# Patient Record
Sex: MALE | Race: WHITE | Employment: PART TIME | URBAN - METROPOLITAN AREA
[De-identification: names, ages, dates, MRNs, and addresses within clinical notes are randomized per-mention and may not be internally consistent; named-entity substitution may affect disease eponyms.]

---

## 2017-01-04 PROBLEM — I70.208 BRACHIAL ARTERY OCCLUSION, LEFT (HCC): Status: ACTIVE | Noted: 2017-01-04

## 2017-01-12 PROBLEM — Z94.0 HISTORY OF RENAL TRANSPLANT: Chronic | Status: ACTIVE | Noted: 2017-01-12

## 2017-01-12 PROBLEM — I10 ESSENTIAL HYPERTENSION: Chronic | Status: ACTIVE | Noted: 2017-01-12

## 2017-02-01 PROBLEM — I70.208 BRACHIAL ARTERY OCCLUSION, LEFT (HCC): Status: RESOLVED | Noted: 2017-01-04 | Resolved: 2017-02-01

## 2017-02-01 PROBLEM — Z98.890 POST-OPERATIVE STATE: Status: ACTIVE | Noted: 2017-02-01

## 2018-05-09 ENCOUNTER — OFFICE VISIT (OUTPATIENT)
Dept: VASCULAR SURGERY | Age: 48
End: 2018-05-09
Payer: COMMERCIAL

## 2018-05-09 DIAGNOSIS — R09.89 DECREASED RADIAL PULSE: Primary | ICD-10-CM

## 2018-05-09 DIAGNOSIS — F17.200 TOBACCO DEPENDENCE: ICD-10-CM

## 2018-05-09 PROBLEM — Z98.890 POST-OPERATIVE STATE: Status: RESOLVED | Noted: 2017-02-01 | Resolved: 2018-05-09

## 2018-05-09 PROCEDURE — 4004F PT TOBACCO SCREEN RCVD TLK: CPT | Performed by: SURGERY

## 2018-05-09 PROCEDURE — G8421 BMI NOT CALCULATED: HCPCS | Performed by: SURGERY

## 2018-05-09 PROCEDURE — 99213 OFFICE O/P EST LOW 20 MIN: CPT | Performed by: SURGERY

## 2018-05-09 PROCEDURE — G8427 DOCREV CUR MEDS BY ELIG CLIN: HCPCS | Performed by: SURGERY

## 2018-05-09 RX ORDER — POTASSIUM & SODIUM PHOSPHATES POWDER PACK 280-160-250 MG 280-160-250 MG
PACK ORAL
Refills: 5 | COMMUNITY
Start: 2018-03-16 | End: 2020-04-05

## 2018-06-19 ENCOUNTER — HOSPITAL ENCOUNTER (OUTPATIENT)
Dept: CARDIOLOGY | Age: 48
Discharge: HOME | End: 2018-06-19
Payer: COMMERCIAL

## 2018-06-19 DIAGNOSIS — R09.89 DECREASED RADIAL PULSE: ICD-10-CM

## 2018-06-19 PROCEDURE — 93923 UPR/LXTR ART STDY 3+ LVLS: CPT

## 2018-06-25 ENCOUNTER — TELEPHONE (OUTPATIENT)
Dept: VASCULAR SURGERY | Age: 48
End: 2018-06-25

## 2020-04-05 ENCOUNTER — APPOINTMENT (OUTPATIENT)
Dept: GENERAL RADIOLOGY | Age: 50
DRG: 247 | End: 2020-04-05
Payer: COMMERCIAL

## 2020-04-05 ENCOUNTER — APPOINTMENT (OUTPATIENT)
Dept: CT IMAGING | Age: 50
DRG: 247 | End: 2020-04-05
Payer: COMMERCIAL

## 2020-04-05 ENCOUNTER — HOSPITAL ENCOUNTER (INPATIENT)
Age: 50
LOS: 2 days | Discharge: HOME | DRG: 247 | End: 2020-04-07
Attending: EMERGENCY MEDICINE | Admitting: SURGERY
Payer: COMMERCIAL

## 2020-04-05 PROBLEM — K56.609 SBO (SMALL BOWEL OBSTRUCTION) (HCC): Status: ACTIVE | Noted: 2020-04-05

## 2020-04-05 LAB
ALBUMIN SERPL-MCNC: 5.1 G/DL (ref 3.5–5.2)
ALP BLD-CCNC: 110 U/L (ref 40–129)
ALT SERPL-CCNC: 11 U/L (ref 0–40)
ANION GAP SERPL CALCULATED.3IONS-SCNC: 17 MMOL/L (ref 7–16)
AST SERPL-CCNC: 32 U/L (ref 0–39)
BACTERIA: NORMAL /HPF
BASOPHILS ABSOLUTE: 0.01 E9/L (ref 0–0.2)
BASOPHILS RELATIVE PERCENT: 0.1 % (ref 0–2)
BILIRUB SERPL-MCNC: 0.7 MG/DL (ref 0–1.2)
BILIRUBIN URINE: NEGATIVE
BLOOD, URINE: ABNORMAL
BUN BLDV-MCNC: 19 MG/DL (ref 6–20)
CALCIUM SERPL-MCNC: 10.8 MG/DL (ref 8.6–10.2)
CHLORIDE BLD-SCNC: 94 MMOL/L (ref 98–107)
CLARITY: CLEAR
CO2: 25 MMOL/L (ref 22–29)
COLOR: YELLOW
CREAT SERPL-MCNC: 1.3 MG/DL (ref 0.7–1.2)
EOSINOPHILS ABSOLUTE: 0 E9/L (ref 0.05–0.5)
EOSINOPHILS RELATIVE PERCENT: 0 % (ref 0–6)
GFR AFRICAN AMERICAN: >60
GFR NON-AFRICAN AMERICAN: 59 ML/MIN/1.73
GLUCOSE BLD-MCNC: 113 MG/DL (ref 74–99)
GLUCOSE URINE: NEGATIVE MG/DL
HCT VFR BLD CALC: 44.7 % (ref 37–54)
HEMOGLOBIN: 15.1 G/DL (ref 12.5–16.5)
IMMATURE GRANULOCYTES #: 0.03 E9/L
IMMATURE GRANULOCYTES %: 0.3 % (ref 0–5)
KETONES, URINE: 40 MG/DL
LACTIC ACID: 2.1 MMOL/L (ref 0.5–2.2)
LEUKOCYTE ESTERASE, URINE: NEGATIVE
LIPASE: 5 U/L (ref 13–60)
LYMPHOCYTES ABSOLUTE: 0.8 E9/L (ref 1.5–4)
LYMPHOCYTES RELATIVE PERCENT: 8.6 % (ref 20–42)
MAGNESIUM: 1.6 MG/DL (ref 1.6–2.6)
MCH RBC QN AUTO: 29.4 PG (ref 26–35)
MCHC RBC AUTO-ENTMCNC: 33.8 % (ref 32–34.5)
MCV RBC AUTO: 87.1 FL (ref 80–99.9)
MONOCYTES ABSOLUTE: 0.95 E9/L (ref 0.1–0.95)
MONOCYTES RELATIVE PERCENT: 10.2 % (ref 2–12)
NEUTROPHILS ABSOLUTE: 7.49 E9/L (ref 1.8–7.3)
NEUTROPHILS RELATIVE PERCENT: 80.8 % (ref 43–80)
NITRITE, URINE: NEGATIVE
PDW BLD-RTO: 14.4 FL (ref 11.5–15)
PH UA: 7.5 (ref 5–9)
PLATELET # BLD: 283 E9/L (ref 130–450)
PMV BLD AUTO: 11.2 FL (ref 7–12)
POTASSIUM REFLEX MAGNESIUM: 3.4 MMOL/L (ref 3.5–5)
PROTEIN UA: >=300 MG/DL
RBC # BLD: 5.13 E12/L (ref 3.8–5.8)
RBC UA: NORMAL /HPF (ref 0–2)
SODIUM BLD-SCNC: 136 MMOL/L (ref 132–146)
SPECIFIC GRAVITY UA: 1.02 (ref 1–1.03)
TOTAL PROTEIN: 8.8 G/DL (ref 6.4–8.3)
UROBILINOGEN, URINE: 0.2 E.U./DL
WBC # BLD: 9.3 E9/L (ref 4.5–11.5)
WBC UA: NORMAL /HPF (ref 0–5)

## 2020-04-05 PROCEDURE — 99285 EMERGENCY DEPT VISIT HI MDM: CPT

## 2020-04-05 PROCEDURE — 74018 RADEX ABDOMEN 1 VIEW: CPT

## 2020-04-05 PROCEDURE — 6360000002 HC RX W HCPCS: Performed by: INTERNAL MEDICINE

## 2020-04-05 PROCEDURE — 83735 ASSAY OF MAGNESIUM: CPT

## 2020-04-05 PROCEDURE — C9113 INJ PANTOPRAZOLE SODIUM, VIA: HCPCS | Performed by: SURGERY

## 2020-04-05 PROCEDURE — 83605 ASSAY OF LACTIC ACID: CPT

## 2020-04-05 PROCEDURE — 81001 URINALYSIS AUTO W/SCOPE: CPT

## 2020-04-05 PROCEDURE — 2580000003 HC RX 258: Performed by: EMERGENCY MEDICINE

## 2020-04-05 PROCEDURE — 74176 CT ABD & PELVIS W/O CONTRAST: CPT

## 2020-04-05 PROCEDURE — 80053 COMPREHEN METABOLIC PANEL: CPT

## 2020-04-05 PROCEDURE — 83690 ASSAY OF LIPASE: CPT

## 2020-04-05 PROCEDURE — 85025 COMPLETE CBC W/AUTO DIFF WBC: CPT

## 2020-04-05 PROCEDURE — 2580000003 HC RX 258: Performed by: SURGERY

## 2020-04-05 PROCEDURE — 1200000000 HC SEMI PRIVATE

## 2020-04-05 PROCEDURE — 6360000002 HC RX W HCPCS: Performed by: SURGERY

## 2020-04-05 PROCEDURE — 71045 X-RAY EXAM CHEST 1 VIEW: CPT

## 2020-04-05 RX ORDER — SODIUM CHLORIDE 0.9 % (FLUSH) 0.9 %
10 SYRINGE (ML) INJECTION EVERY 12 HOURS SCHEDULED
Status: DISCONTINUED | OUTPATIENT
Start: 2020-04-05 | End: 2020-04-07 | Stop reason: HOSPADM

## 2020-04-05 RX ORDER — SODIUM CHLORIDE 9 MG/ML
INJECTION, SOLUTION INTRAVENOUS CONTINUOUS
Status: DISCONTINUED | OUTPATIENT
Start: 2020-04-05 | End: 2020-04-05

## 2020-04-05 RX ORDER — SIROLIMUS 1 MG/1
1 TABLET, FILM COATED ORAL DAILY
Status: DISCONTINUED | OUTPATIENT
Start: 2020-04-06 | End: 2020-04-07 | Stop reason: HOSPADM

## 2020-04-05 RX ORDER — SODIUM CHLORIDE 9 MG/ML
10 INJECTION INTRAVENOUS DAILY
Status: DISCONTINUED | OUTPATIENT
Start: 2020-04-05 | End: 2020-04-07 | Stop reason: HOSPADM

## 2020-04-05 RX ORDER — SODIUM CHLORIDE 0.9 % (FLUSH) 0.9 %
10 SYRINGE (ML) INJECTION PRN
Status: DISCONTINUED | OUTPATIENT
Start: 2020-04-05 | End: 2020-04-07 | Stop reason: HOSPADM

## 2020-04-05 RX ORDER — ONDANSETRON 2 MG/ML
4 INJECTION INTRAMUSCULAR; INTRAVENOUS EVERY 6 HOURS PRN
Status: DISCONTINUED | OUTPATIENT
Start: 2020-04-05 | End: 2020-04-07 | Stop reason: HOSPADM

## 2020-04-05 RX ORDER — PANTOPRAZOLE SODIUM 40 MG/10ML
40 INJECTION, POWDER, LYOPHILIZED, FOR SOLUTION INTRAVENOUS DAILY
Status: DISCONTINUED | OUTPATIENT
Start: 2020-04-05 | End: 2020-04-07 | Stop reason: HOSPADM

## 2020-04-05 RX ORDER — SODIUM CHLORIDE, SODIUM LACTATE, POTASSIUM CHLORIDE, CALCIUM CHLORIDE 600; 310; 30; 20 MG/100ML; MG/100ML; MG/100ML; MG/100ML
INJECTION, SOLUTION INTRAVENOUS CONTINUOUS
Status: ACTIVE | OUTPATIENT
Start: 2020-04-05 | End: 2020-04-06

## 2020-04-05 RX ORDER — 0.9 % SODIUM CHLORIDE 0.9 %
1000 INTRAVENOUS SOLUTION INTRAVENOUS ONCE
Status: COMPLETED | OUTPATIENT
Start: 2020-04-05 | End: 2020-04-05

## 2020-04-05 RX ORDER — AZATHIOPRINE 50 MG/1
50 TABLET ORAL DAILY
Status: DISCONTINUED | OUTPATIENT
Start: 2020-04-06 | End: 2020-04-07 | Stop reason: HOSPADM

## 2020-04-05 RX ORDER — POTASSIUM CHLORIDE 7.45 MG/ML
10 INJECTION INTRAVENOUS
Status: COMPLETED | OUTPATIENT
Start: 2020-04-05 | End: 2020-04-05

## 2020-04-05 RX ADMIN — POTASSIUM CHLORIDE 10 MEQ: 10 INJECTION, SOLUTION INTRAVENOUS at 15:56

## 2020-04-05 RX ADMIN — POTASSIUM CHLORIDE 10 MEQ: 10 INJECTION, SOLUTION INTRAVENOUS at 17:12

## 2020-04-05 RX ADMIN — Medication 10 ML: at 20:40

## 2020-04-05 RX ADMIN — HYDROMORPHONE HYDROCHLORIDE 0.5 MG: 1 INJECTION, SOLUTION INTRAMUSCULAR; INTRAVENOUS; SUBCUTANEOUS at 15:56

## 2020-04-05 RX ADMIN — Medication 10 ML: at 15:56

## 2020-04-05 RX ADMIN — SODIUM CHLORIDE 1000 ML: 9 INJECTION, SOLUTION INTRAVENOUS at 12:36

## 2020-04-05 RX ADMIN — ENOXAPARIN SODIUM 40 MG: 40 INJECTION SUBCUTANEOUS at 15:56

## 2020-04-05 RX ADMIN — SODIUM CHLORIDE, POTASSIUM CHLORIDE, SODIUM LACTATE AND CALCIUM CHLORIDE: 600; 310; 30; 20 INJECTION, SOLUTION INTRAVENOUS at 13:17

## 2020-04-05 RX ADMIN — HYDROMORPHONE HYDROCHLORIDE 0.5 MG: 1 INJECTION, SOLUTION INTRAMUSCULAR; INTRAVENOUS; SUBCUTANEOUS at 20:39

## 2020-04-05 RX ADMIN — PANTOPRAZOLE SODIUM 40 MG: 40 INJECTION, POWDER, FOR SOLUTION INTRAVENOUS at 15:56

## 2020-04-05 ASSESSMENT — ENCOUNTER SYMPTOMS
VOMITING: 1
BLOOD IN STOOL: 0
COLOR CHANGE: 0
TROUBLE SWALLOWING: 0
ABDOMINAL DISTENTION: 0
NAUSEA: 1
CHEST TIGHTNESS: 0
ANAL BLEEDING: 0
CONSTIPATION: 1
EYE PAIN: 0
SHORTNESS OF BREATH: 0
DIARRHEA: 0
SORE THROAT: 0
EYE REDNESS: 0
HEMATOCHEZIA: 0
COUGH: 0
ABDOMINAL PAIN: 1

## 2020-04-05 ASSESSMENT — PAIN DESCRIPTION - LOCATION
LOCATION: ABDOMEN
LOCATION: ABDOMEN

## 2020-04-05 ASSESSMENT — PAIN DESCRIPTION - DESCRIPTORS
DESCRIPTORS: PRESSURE;SHOOTING;ACHING
DESCRIPTORS: ACHING;DISCOMFORT;DULL

## 2020-04-05 ASSESSMENT — PAIN DESCRIPTION - PAIN TYPE: TYPE: ACUTE PAIN

## 2020-04-05 ASSESSMENT — PAIN DESCRIPTION - ORIENTATION
ORIENTATION: LEFT;MID
ORIENTATION: RIGHT;LEFT;LOWER

## 2020-04-05 ASSESSMENT — PAIN SCALES - GENERAL
PAINLEVEL_OUTOF10: 6
PAINLEVEL_OUTOF10: 4
PAINLEVEL_OUTOF10: 6
PAINLEVEL_OUTOF10: 3
PAINLEVEL_OUTOF10: 4

## 2020-04-05 ASSESSMENT — PAIN DESCRIPTION - ONSET: ONSET: ON-GOING

## 2020-04-05 ASSESSMENT — PAIN - FUNCTIONAL ASSESSMENT: PAIN_FUNCTIONAL_ASSESSMENT: PREVENTS OR INTERFERES SOME ACTIVE ACTIVITIES AND ADLS

## 2020-04-05 ASSESSMENT — PAIN DESCRIPTION - FREQUENCY
FREQUENCY: CONTINUOUS
FREQUENCY: CONTINUOUS

## 2020-04-05 ASSESSMENT — PAIN DESCRIPTION - PROGRESSION: CLINICAL_PROGRESSION: GRADUALLY WORSENING

## 2020-04-05 NOTE — ED PROVIDER NOTES
Patient is a 40-year-old male, with a past medical history of renal transplant, who presents via private vehicle for constipation abdominal pain and nausea. Patient reports that he last moved his bowels 4 days ago. He has not had any bowel movement since and is noticed that he is not been passing gas for the past couple of days. His last bowel movement was soft and formed. Reports a sharp stabbing left lower quadrant pain which is worse with lying down or on his side and better with sitting up. He has taken MiraLAX without relief. He reports associated nausea anytime he eats or drinks. He reports one episode of emesis after taking his medications yesterday but was able to take his medications today and kept them down. He denies previous similar episodes. Denies chronic pain medication use. Denies any fevers or chills or sweats. Last colonoscopy was 45 years ago and was normal.  Denies history of diverticulosis or diverticulitis. The history is provided by the patient. Abdominal Pain   Pain location:  LLQ  Pain quality: sharp and stabbing    Pain radiates to:  Does not radiate  Pain severity:  Moderate  Onset quality:  Gradual  Timing:  Intermittent  Progression:  Worsening  Chronicity:  New  Context: previous surgery    Context: not alcohol use, not diet changes, not eating, not medication withdrawal, not recent travel, not sick contacts, not suspicious food intake and not trauma    Relieved by:  Nothing  Worsened by:  Palpation and position changes  Ineffective treatments: miralax.   Associated symptoms: constipation, nausea and vomiting    Associated symptoms: no anorexia, no chest pain, no chills, no cough, no diarrhea, no dysuria, no fever, no hematochezia, no hematuria, no melena, no shortness of breath and no sore throat    Risk factors: no alcohol abuse, not elderly, has not had multiple surgeries, not obese and no recent hospitalization         Review of Systems   Constitutional: Positive for appetite change. Negative for chills and fever. HENT: Negative for congestion, mouth sores, sore throat and trouble swallowing. Eyes: Negative for pain, redness and visual disturbance. Respiratory: Negative for cough, chest tightness and shortness of breath. Cardiovascular: Negative for chest pain and palpitations. Gastrointestinal: Positive for abdominal pain, constipation, nausea and vomiting. Negative for abdominal distention, anal bleeding, anorexia, blood in stool, diarrhea, hematochezia and melena. Genitourinary: Negative for difficulty urinating, discharge, dysuria, flank pain, frequency, hematuria, penile pain, penile swelling, scrotal swelling, testicular pain and urgency. Musculoskeletal: Negative for arthralgias and myalgias. Skin: Negative for color change, pallor and rash. Allergic/Immunologic: Negative for immunocompromised state. Neurological: Negative for light-headedness and headaches. Hematological: Negative for adenopathy. Physical Exam  Vitals signs and nursing note reviewed. Constitutional:       General: He is not in acute distress. Appearance: Normal appearance. He is well-developed and normal weight. He is not ill-appearing, toxic-appearing or diaphoretic. HENT:      Head: Normocephalic and atraumatic. Nose: Nose normal. No congestion or rhinorrhea. Mouth/Throat:      Mouth: Mucous membranes are moist.      Pharynx: Oropharynx is clear. No oropharyngeal exudate or posterior oropharyngeal erythema. Eyes:      General: No scleral icterus. Right eye: No discharge. Left eye: No discharge. Conjunctiva/sclera: Conjunctivae normal.      Pupils: Pupils are equal, round, and reactive to light. Neck:      Musculoskeletal: Normal range of motion and neck supple. Thyroid: No thyromegaly. Cardiovascular:      Rate and Rhythm: Normal rate and regular rhythm. Pulses: Normal pulses.       Heart sounds: Normal heart products    -------------------------------------------------- RESULTS -------------------------------------------------    LABS:  Results for orders placed or performed during the hospital encounter of 04/05/20   CBC Auto Differential   Result Value Ref Range    WBC 9.3 4.5 - 11.5 E9/L    RBC 5.13 3.80 - 5.80 E12/L    Hemoglobin 15.1 12.5 - 16.5 g/dL    Hematocrit 44.7 37.0 - 54.0 %    MCV 87.1 80.0 - 99.9 fL    MCH 29.4 26.0 - 35.0 pg    MCHC 33.8 32.0 - 34.5 %    RDW 14.4 11.5 - 15.0 fL    Platelets 626 890 - 918 E9/L    MPV 11.2 7.0 - 12.0 fL    Neutrophils % 80.8 (H) 43.0 - 80.0 %    Immature Granulocytes % 0.3 0.0 - 5.0 %    Lymphocytes % 8.6 (L) 20.0 - 42.0 %    Monocytes % 10.2 2.0 - 12.0 %    Eosinophils % 0.0 0.0 - 6.0 %    Basophils % 0.1 0.0 - 2.0 %    Neutrophils Absolute 7.49 (H) 1.80 - 7.30 E9/L    Immature Granulocytes # 0.03 E9/L    Lymphocytes Absolute 0.80 (L) 1.50 - 4.00 E9/L    Monocytes Absolute 0.95 0.10 - 0.95 E9/L    Eosinophils Absolute 0.00 (L) 0.05 - 0.50 E9/L    Basophils Absolute 0.01 0.00 - 0.20 E9/L   Comprehensive Metabolic Panel w/ Reflex to MG   Result Value Ref Range    Sodium 136 132 - 146 mmol/L    Potassium reflex Magnesium 3.4 (L) 3.5 - 5.0 mmol/L    Chloride 94 (L) 98 - 107 mmol/L    CO2 25 22 - 29 mmol/L    Anion Gap 17 (H) 7 - 16 mmol/L    Glucose 113 (H) 74 - 99 mg/dL    BUN 19 6 - 20 mg/dL    CREATININE 1.3 (H) 0.7 - 1.2 mg/dL    GFR Non-African American 59 >=60 mL/min/1.73    GFR African American >60     Calcium 10.8 (H) 8.6 - 10.2 mg/dL    Total Protein 8.8 (H) 6.4 - 8.3 g/dL    Alb 5.1 3.5 - 5.2 g/dL    Total Bilirubin 0.7 0.0 - 1.2 mg/dL    Alkaline Phosphatase 110 40 - 129 U/L    ALT 11 0 - 40 U/L    AST 32 0 - 39 U/L   Lipase   Result Value Ref Range    Lipase 5 (L) 13 - 60 U/L   Urinalysis, reflex to microscopic   Result Value Ref Range    Color, UA Yellow Straw/Yellow    Clarity, UA Clear Clear    Glucose, Ur Negative Negative mg/dL    Bilirubin Urine Negative

## 2020-04-05 NOTE — H&P
History    Not on file   Social Needs    Financial resource strain: Not on file    Food insecurity     Worry: Not on file     Inability: Not on file    Transportation needs     Medical: Not on file     Non-medical: Not on file   Tobacco Use    Smoking status: Light Tobacco Smoker     Packs/day: 0.25     Types: Cigarettes    Smokeless tobacco: Never Used   Substance and Sexual Activity    Alcohol use: Yes     Comment: rare    Drug use: No    Sexual activity: Not on file   Lifestyle    Physical activity     Days per week: Not on file     Minutes per session: Not on file    Stress: Not on file   Relationships    Social connections     Talks on phone: Not on file     Gets together: Not on file     Attends Tenriism service: Not on file     Active member of club or organization: Not on file     Attends meetings of clubs or organizations: Not on file     Relationship status: Not on file    Intimate partner violence     Fear of current or ex partner: Not on file     Emotionally abused: Not on file     Physically abused: Not on file     Forced sexual activity: Not on file   Other Topics Concern    Not on file   Social History Narrative    Not on file           Review of Systems  Negative times ten except what was stated in HPI and PMH    Physical exam:   /67   Pulse 81   Temp 98.2 °F (36.8 °C) (Oral)   Resp 14   Ht 5' 10\" (1.778 m)   Wt 115 lb (52.2 kg)   SpO2 100%   BMI 16.50 kg/m²   General appearance: AAOx3, NAD  Head: NCAT, PERRLA, EOMI, red conjunctiva  Neck: supple, no masses  Lungs: CTAB, equal chest rise bilateral  Heart: Reg rate  Abdomen: soft, mild tenderness diffusely, no guarding/rebound, mild distended, no palpable hernias or defects, surgical scars were present. Skin: no lesions  Gu: no cva tenderness  Extremities: extremities normal, atraumatic, no cyanosis or edema    Labs:  Results for Bhavya Beltran (MRN [id number]) as of 4/5/2020 13:44   Ref.  Range 4/5/2020 10:57   Sodium Monocytes Absolute Latest Ref Range: 0.10 - 0.95 E9/L 0.95   Eosinophils Absolute Latest Ref Range: 0.05 - 0.50 E9/L 0.00 (L)   Basophils Absolute Latest Ref Range: 0.00 - 0.20 E9/L 0.01   Color, UA Latest Ref Range: Straw/Yellow  Yellow   Clarity, UA Latest Ref Range: Clear  Clear   Glucose, UA Latest Ref Range: Negative mg/dL Negative   Bilirubin, Urine Latest Ref Range: Negative  Negative   Ketones, Urine Latest Ref Range: Negative mg/dL 40 (A)   Specific [place], UA Latest Ref Range: 1.005 - 1.030  1.020   Blood, Urine Latest Ref Range: Negative  SMALL (A)   pH, UA Latest Ref Range: 5.0 - 9.0  7.5   Protein, UA Latest Ref Range: Negative mg/dL >=300 (A)   Urobilinogen, Urine Latest Ref Range: <2.0 E.U./dL 0.2   Nitrite, Urine Latest Ref Range: Negative  Negative   Leukocyte Esterase, Urine Latest Ref Range: Negative  Negative   WBC, UA Latest Ref Range: 0 - 5 /HPF NONE   RBC, UA Latest Ref Range: 0 - 2 /HPF 1-3   Bacteria, UA Latest Ref Range: None Seen /HPF NONE SEEN       Radiology:  CT abdomen/pelvis:      Impression           1. Small bowel obstruction with transition in the left lower quadrant. 2. Bilateral nephrectomies, with small two transplanted kidneys in the   right iliac fossa no stone or hydronephrosis.          Assessment:  52 y.o. male with history of kidney transplant with SBO    Plan:  NPO  NG decompression  IVF  No acute abdomen at this time  Consult nephrology for management of transplant meds  Consult medicine for medical management      Joseline Heart MD  4/5/2020  1:43 PM

## 2020-04-06 ENCOUNTER — APPOINTMENT (OUTPATIENT)
Dept: GENERAL RADIOLOGY | Age: 50
DRG: 247 | End: 2020-04-06
Payer: COMMERCIAL

## 2020-04-06 LAB
ANION GAP SERPL CALCULATED.3IONS-SCNC: 17 MMOL/L (ref 7–16)
BUN BLDV-MCNC: 27 MG/DL (ref 6–20)
CALCIUM SERPL-MCNC: 9.7 MG/DL (ref 8.6–10.2)
CHLORIDE BLD-SCNC: 99 MMOL/L (ref 98–107)
CO2: 24 MMOL/L (ref 22–29)
CREAT SERPL-MCNC: 1.1 MG/DL (ref 0.7–1.2)
GFR AFRICAN AMERICAN: >60
GFR NON-AFRICAN AMERICAN: >60 ML/MIN/1.73
GLUCOSE BLD-MCNC: 104 MG/DL (ref 74–99)
HCT VFR BLD CALC: 39.2 % (ref 37–54)
HEMOGLOBIN: 13.1 G/DL (ref 12.5–16.5)
MAGNESIUM: 1.5 MG/DL (ref 1.6–2.6)
MCH RBC QN AUTO: 29 PG (ref 26–35)
MCHC RBC AUTO-ENTMCNC: 33.4 % (ref 32–34.5)
MCV RBC AUTO: 86.9 FL (ref 80–99.9)
PDW BLD-RTO: 14.5 FL (ref 11.5–15)
PHOSPHORUS: 3.5 MG/DL (ref 2.5–4.5)
PLATELET # BLD: 192 E9/L (ref 130–450)
PMV BLD AUTO: 10.9 FL (ref 7–12)
POTASSIUM REFLEX MAGNESIUM: 3.5 MMOL/L (ref 3.5–5)
RBC # BLD: 4.51 E12/L (ref 3.8–5.8)
SODIUM BLD-SCNC: 140 MMOL/L (ref 132–146)
WBC # BLD: 8.6 E9/L (ref 4.5–11.5)

## 2020-04-06 PROCEDURE — 36415 COLL VENOUS BLD VENIPUNCTURE: CPT

## 2020-04-06 PROCEDURE — C9113 INJ PANTOPRAZOLE SODIUM, VIA: HCPCS | Performed by: SURGERY

## 2020-04-06 PROCEDURE — 6360000002 HC RX W HCPCS: Performed by: SURGERY

## 2020-04-06 PROCEDURE — 80048 BASIC METABOLIC PNL TOTAL CA: CPT

## 2020-04-06 PROCEDURE — 84100 ASSAY OF PHOSPHORUS: CPT

## 2020-04-06 PROCEDURE — 74019 RADEX ABDOMEN 2 VIEWS: CPT

## 2020-04-06 PROCEDURE — 6360000004 HC RX CONTRAST MEDICATION: Performed by: RADIOLOGY

## 2020-04-06 PROCEDURE — 6360000002 HC RX W HCPCS: Performed by: INTERNAL MEDICINE

## 2020-04-06 PROCEDURE — 80195 ASSAY OF SIROLIMUS: CPT

## 2020-04-06 PROCEDURE — 2580000003 HC RX 258: Performed by: SURGERY

## 2020-04-06 PROCEDURE — 74250 X-RAY XM SM INT 1CNTRST STD: CPT

## 2020-04-06 PROCEDURE — 2580000003 HC RX 258: Performed by: INTERNAL MEDICINE

## 2020-04-06 PROCEDURE — 85027 COMPLETE CBC AUTOMATED: CPT

## 2020-04-06 PROCEDURE — 1200000000 HC SEMI PRIVATE

## 2020-04-06 PROCEDURE — 83735 ASSAY OF MAGNESIUM: CPT

## 2020-04-06 RX ORDER — SODIUM CHLORIDE, SODIUM LACTATE, POTASSIUM CHLORIDE, CALCIUM CHLORIDE 600; 310; 30; 20 MG/100ML; MG/100ML; MG/100ML; MG/100ML
INJECTION, SOLUTION INTRAVENOUS CONTINUOUS
Status: DISCONTINUED | OUTPATIENT
Start: 2020-04-06 | End: 2020-04-07

## 2020-04-06 RX ORDER — MAGNESIUM SULFATE 1 G/100ML
1 INJECTION INTRAVENOUS ONCE
Status: COMPLETED | OUTPATIENT
Start: 2020-04-06 | End: 2020-04-06

## 2020-04-06 RX ADMIN — SODIUM CHLORIDE, POTASSIUM CHLORIDE, SODIUM LACTATE AND CALCIUM CHLORIDE: 600; 310; 30; 20 INJECTION, SOLUTION INTRAVENOUS at 20:00

## 2020-04-06 RX ADMIN — SIROLIMUS 1 MG: 1 TABLET, SUGAR COATED ORAL at 08:16

## 2020-04-06 RX ADMIN — Medication 10 ML: at 00:15

## 2020-04-06 RX ADMIN — DIATRIZOATE MEGLUMINE AND DIATRIZOATE SODIUM 240 ML: 660; 100 LIQUID ORAL; RECTAL at 14:40

## 2020-04-06 RX ADMIN — AZATHIOPRINE 50 MG: 50 TABLET ORAL at 08:16

## 2020-04-06 RX ADMIN — Medication 10 ML: at 20:38

## 2020-04-06 RX ADMIN — SODIUM CHLORIDE, POTASSIUM CHLORIDE, SODIUM LACTATE AND CALCIUM CHLORIDE: 600; 310; 30; 20 INJECTION, SOLUTION INTRAVENOUS at 12:00

## 2020-04-06 RX ADMIN — ENOXAPARIN SODIUM 40 MG: 40 INJECTION SUBCUTANEOUS at 08:15

## 2020-04-06 RX ADMIN — Medication 10 ML: at 08:15

## 2020-04-06 RX ADMIN — PANTOPRAZOLE SODIUM 40 MG: 40 INJECTION, POWDER, FOR SOLUTION INTRAVENOUS at 08:14

## 2020-04-06 RX ADMIN — Medication 10 ML: at 08:17

## 2020-04-06 RX ADMIN — HYDROMORPHONE HYDROCHLORIDE 0.5 MG: 1 INJECTION, SOLUTION INTRAMUSCULAR; INTRAVENOUS; SUBCUTANEOUS at 00:15

## 2020-04-06 RX ADMIN — MAGNESIUM SULFATE HEPTAHYDRATE 1 G: 1 INJECTION, SOLUTION INTRAVENOUS at 13:40

## 2020-04-06 ASSESSMENT — PAIN DESCRIPTION - PAIN TYPE: TYPE: ACUTE PAIN

## 2020-04-06 ASSESSMENT — PAIN DESCRIPTION - DESCRIPTORS: DESCRIPTORS: ACHING;DISCOMFORT;DULL

## 2020-04-06 ASSESSMENT — PAIN SCALES - GENERAL
PAINLEVEL_OUTOF10: 0
PAINLEVEL_OUTOF10: 3
PAINLEVEL_OUTOF10: 5
PAINLEVEL_OUTOF10: 0

## 2020-04-06 ASSESSMENT — PAIN - FUNCTIONAL ASSESSMENT: PAIN_FUNCTIONAL_ASSESSMENT: PREVENTS OR INTERFERES SOME ACTIVE ACTIVITIES AND ADLS

## 2020-04-06 ASSESSMENT — PAIN DESCRIPTION - PROGRESSION: CLINICAL_PROGRESSION: GRADUALLY WORSENING

## 2020-04-06 ASSESSMENT — PAIN DESCRIPTION - ORIENTATION: ORIENTATION: MID;LOWER

## 2020-04-06 ASSESSMENT — PAIN DESCRIPTION - LOCATION: LOCATION: ABDOMEN

## 2020-04-06 ASSESSMENT — PAIN DESCRIPTION - FREQUENCY: FREQUENCY: CONTINUOUS

## 2020-04-06 ASSESSMENT — PAIN DESCRIPTION - ONSET: ONSET: AWAKENED FROM SLEEP

## 2020-04-06 NOTE — FLOWSHEET NOTE
On 4//6/2020 at approximately 10:33 AM,  attempted to contact patient to provide spiritual support. Patient answered the 's call and appeared to be excited to speak with the caplain. During the conversation the patient stated that he is, \"doing much better. The patient stated the he attends [employer]. The patient stated that he has retired from working as an  of a [address] [place] [address] [employer]. The patient stated that his familial supports consisted of his parents.  then offered to pray with the patient. The patient agreed. The  prayed and the conversation was ended shortly thereafter.

## 2020-04-06 NOTE — CARE COORDINATION
Spoke with pt by phone  ;introduced myself as an RN case manager and explained my role. Discussed current course of treatment/plan of care; pt expressed understanding. states had renal transplant in 1992. Also states pt just had a BM , feels much better, wanting to eat. Awaiting visit by surgery. Pt states independent at home; denies any needs. Plan is to return home. Will follow. Wandy Garner.

## 2020-04-06 NOTE — H&P
History and Physical  Internal Medicine  Idalmis Sapp MD    CHIEF COMPLAINT:  Small bowel obstuction      HISTORY OF PRESENT ILLNESS:      The patient is a 52 y.o. male patient of mine who presents with abdominal pain - as per ER - Patient is a 51-year-old male, with a past medical history of renal transplant, who presents via private vehicle for constipation abdominal pain and nausea. Patient reports that he last moved his bowels 4 days ago. He has not had any bowel movement since and is noticed that he is not been passing gas for the past couple of days. His last bowel movement was soft and formed. Reports a sharp stabbing left lower quadrant pain which is worse with lying down or on his side and better with sitting up. He has taken MiraLAX without relief. He reports associated nausea anytime he eats or drinks. He reports one episode of emesis after taking his medications yesterday but was able to take his medications today and kept them down. He denies previous similar episodes. Denies chronic pain medication use. Denies any fevers or chills or sweats. Last colonoscopy was 45 years ago and was normal.  Denies history of diverticulosis or diverticulitis.         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 Sharda Wade  2013   [facility] Mckinley    OTHER SURGICAL HISTORY  12/20/2016    Left Subclavian medications yesterday but was able to take his medications today and kept them down. He denies previous similar episodes. Denies chronic pain medication use. Denies any fevers or chills or sweats. Last colonoscopy was 45 years ago and was normal.  Denies history of diverticulosis or diverticulitis.   As above in the HPI, otherwise negative    Vital Signs:  BP (!) 169/89   Pulse 60   Temp 98.4 °F (36.9 °C) (Axillary)   Resp 18   Ht 5' 10\" (1.778 m)   Wt 115 lb (52.2 kg)   SpO2 99%   BMI 16.50 kg/m²     Physical Exam:    NG in place draining still    LABS:    Recent Results (from the past 24 hour(s))   CBC Auto Differential    Collection Time: 04/05/20 10:57 AM   Result Value Ref Range    WBC 9.3 4.5 - 11.5 E9/L    RBC 5.13 3.80 - 5.80 E12/L    Hemoglobin 15.1 12.5 - 16.5 g/dL    Hematocrit 44.7 37.0 - 54.0 %    MCV 87.1 80.0 - 99.9 fL    MCH 29.4 26.0 - 35.0 pg    MCHC 33.8 32.0 - 34.5 %    RDW 14.4 11.5 - 15.0 fL    Platelets 033 383 - 542 E9/L    MPV 11.2 7.0 - 12.0 fL    Neutrophils % 80.8 (H) 43.0 - 80.0 %    Immature Granulocytes % 0.3 0.0 - 5.0 %    Lymphocytes % 8.6 (L) 20.0 - 42.0 %    Monocytes % 10.2 2.0 - 12.0 %    Eosinophils % 0.0 0.0 - 6.0 %    Basophils % 0.1 0.0 - 2.0 %    Neutrophils Absolute 7.49 (H) 1.80 - 7.30 E9/L    Immature Granulocytes # 0.03 E9/L    Lymphocytes Absolute 0.80 (L) 1.50 - 4.00 E9/L    Monocytes Absolute 0.95 0.10 - 0.95 E9/L    Eosinophils Absolute 0.00 (L) 0.05 - 0.50 E9/L    Basophils Absolute 0.01 0.00 - 0.20 E9/L   Comprehensive Metabolic Panel w/ Reflex to MG    Collection Time: 04/05/20 10:57 AM   Result Value Ref Range    Sodium 136 132 - 146 mmol/L    Potassium reflex Magnesium 3.4 (L) 3.5 - 5.0 mmol/L    Chloride 94 (L) 98 - 107 mmol/L    CO2 25 22 - 29 mmol/L    Anion Gap 17 (H) 7 - 16 mmol/L    Glucose 113 (H) 74 - 99 mg/dL    BUN 19 6 - 20 mg/dL    CREATININE 1.3 (H) 0.7 - 1.2 mg/dL    GFR Non-African American 59 >=60 mL/min/1.73    GFR African American >60 consolidation or pleural effusion. There is chronic lacune of  the right costophrenic angle, likely secondary to pleural-parenchymal  scarring. There is no pneumothorax. The visualized osseous structures  are unremarkable. Surgical staples are identified in the upper  abdomen. Radiopaque foreign bodies of metallic density are seen in the  soft tissues of the posterior right chest wall. Lines and Tubes: None. Impression No focal consolidation and no significant interval change  since examination dated 06/15/13. Results for orders placed during the hospital encounter of 20   XR CHEST PORTABLE    Narrative Patient MRN: [id number]  : 1970  Age:  52 years  Gender: Male  Order Date: 2020 12:45 PM    Exam: XR CHEST PORTABLE    Number of Images: 1 view    Indication:  Nasogastric tube placement    Comparison: None. Findings: The nasogastric tube extends left upper abdomen, directed towards the  fundus of the stomach. The sidehole is in the stomach. No  complications related to placement are noted. The lungs are symmetrically hyperinflated, and are clear. There is no  evidence of pneumothorax or pleural effusion. Cardiovascular shadows are normal in appearance. There is no evidence  of cardiac enlargement or decompensation. Skeletal structures show no evidence of acute pathology. Wire suture  artifacts overlie the right lower lung and surgical clips are noted in  the epigastric region. Impression Uncomplicated appearance of the nasogastric tube, extending to the  fundus of the stomach in the left upper abdomen. Pulmonary hyperinflation without definite evidence of acute  cardiopulmonary pathology. Other:  none      ASSESSMENT:      Active Problems:    SBO (small bowel obstruction) ([facility])  Resolved Problems:    * No resolved hospital problems.  *      PLAN:    Medical issues stable at present    Jeanette Mickie  6:57 AM  2020

## 2020-04-06 NOTE — PROGRESS NOTES
NRBC 1 10/05/2013    SEGSPCT 59 10/05/2013    LYMPHOPCT 8.6 04/05/2020    MONOPCT 10.2 04/05/2020    BASOPCT 0.1 04/05/2020    MONOSABS 0.95 04/05/2020    LYMPHSABS 0.80 04/05/2020    EOSABS 0.00 04/05/2020    BASOSABS 0.01 04/05/2020     BMP:    Lab Results   Component Value Date     04/06/2020    K 3.5 04/06/2020    CL 99 04/06/2020    CO2 24 04/06/2020    BUN 27 04/06/2020    LABALBU 5.1 04/05/2020    LABALBU 4.6 12/03/2011    CREATININE 1.1 04/06/2020    CALCIUM 9.7 04/06/2020    GFRAA >60 04/06/2020    LABGLOM >60 04/06/2020    GLUCOSE 104 04/06/2020    GLUCOSE 103 12/03/2011     U/A:    Lab Results   Component Value Date    COLORU Yellow 04/05/2020    PROTEINU >=300 04/05/2020    PHUR 7.5 04/05/2020    LABCAST RARE 06/06/2015    WBCUA NONE 04/05/2020    WBCUA NONE 12/03/2011    RBCUA 1-3 04/05/2020    RBCUA 0-1 03/19/2014    MUCUS Present 11/13/2014    BACTERIA NONE SEEN 04/05/2020    CLARITYU Clear 04/05/2020    SPECGRAV 1.020 04/05/2020    LEUKOCYTESUR Negative 04/05/2020    UROBILINOGEN 0.2 04/05/2020    BILIRUBINUR Negative 04/05/2020    BILIRUBINUR NEGATIVE 12/03/2011    BLOODU SMALL 04/05/2020    GLUCOSEU Negative 04/05/2020    GLUCOSEU NEGATIVE 12/03/2011   Urine albumin creatinine ratio 346     sodium chloride flush  10 mL Intravenous 2 times per day    enoxaparin  40 mg Subcutaneous Daily    pantoprazole  40 mg Intravenous Daily    And    sodium chloride (PF)  10 mL Intravenous Daily    sirolimus  1 mg Oral Daily    azaTHIOprine  50 mg Oral Daily       sodium chloride flush, HYDROmorphone, ondansetron    IMPRESSION/RECOMMENDATIONS:      Status post renal transplant with baseline creatinine 1.1  History of proteinuria  Obstipation we will resume IV fluids until small bowel follow-through completed  Hypertension may need to resume some of his medications  Hypercalcemia on presentation may have been because of obstipation may need to discontinue hydrochlorothiazide as part of his hypertensive regimen  Hypomagnesemia will supplement  Hypokalemia may need additional supplementation      Princess Mccann MD  4/6/2020 11:24 AM

## 2020-04-06 NOTE — CARE COORDINATION
Social Work / Discharge Planning : SW and CM spoke to patient via phone and explained role as discharge planner/ transition of care. Patient states plan at discharge is HOME. Patient denies any home needs. Patient had a renal transplant in 1992 and doing well. Patient is independent. Patient PCP is DR Jamin Gorman and he uses [facility] on [facility]. Patient admitted with SBO and happily stated he just went to the bathroom. . SW to follow.  Electronically signed by RICARDO Ivan on 4/6/20 at 11:10 AM EDT

## 2020-04-06 NOTE — PLAN OF CARE
Problem: Pain:  Goal: Pain level will decrease  Description: Pain level will decrease  Outcome: Met This Shift

## 2020-04-06 NOTE — PROGRESS NOTES
General Surgery Progress Note    Surgeon:  Dr. Hilda Gomes  Subjective:   Pain:    Much improved  Diet:    NPO  Nausea: None  BM/Flatus: +small BM and flatus this am    BP (!) 176/99   Pulse 85   Temp 98.6 °F (37 °C) (Oral)   Resp 18   Ht 5' 10\" (1.778 m)   Wt 115 lb (52.2 kg)   SpO2 97%   BMI 16.50 kg/m²      General:  no acute distress  Lungs:  non-labored breathing  Heart:   Pulse is regular  Abdomen:  soft, nontender, nondistended, no guarding/rebound tenderness    KUB  Impression       1. Gas distended small bowel loops consistent with patient's history   of small bowel obstruction. 2. Tip of the nasogastric tube overlies the stomach.           ASSESSMENT / PLAN:   · SBO, resolving  · Check SBFT as KUB still showed distention today    Danitza Fung MD  4/6/2020  12:59 PM

## 2020-04-06 NOTE — PLAN OF CARE
Problem: Falls - Risk of:  Goal: Will remain free from falls  Description: Will remain free from falls  Outcome: Met This Shift     Problem: Nausea/Vomiting:  Goal: Absence of nausea/vomiting  Description: Absence of nausea/vomiting  Outcome: Met This Shift

## 2020-04-07 VITALS
SYSTOLIC BLOOD PRESSURE: 150 MMHG | HEIGHT: 70 IN | HEART RATE: 64 BPM | RESPIRATION RATE: 18 BRPM | TEMPERATURE: 98.3 F | DIASTOLIC BLOOD PRESSURE: 87 MMHG | WEIGHT: 115 LBS | BODY MASS INDEX: 16.46 KG/M2 | OXYGEN SATURATION: 98 %

## 2020-04-07 PROCEDURE — 2580000003 HC RX 258: Performed by: SURGERY

## 2020-04-07 PROCEDURE — 6360000002 HC RX W HCPCS: Performed by: SURGERY

## 2020-04-07 PROCEDURE — 2580000003 HC RX 258: Performed by: INTERNAL MEDICINE

## 2020-04-07 PROCEDURE — 6360000002 HC RX W HCPCS: Performed by: INTERNAL MEDICINE

## 2020-04-07 PROCEDURE — 6370000000 HC RX 637 (ALT 250 FOR IP): Performed by: INTERNAL MEDICINE

## 2020-04-07 PROCEDURE — C9113 INJ PANTOPRAZOLE SODIUM, VIA: HCPCS | Performed by: SURGERY

## 2020-04-07 RX ORDER — DILTIAZEM HYDROCHLORIDE 120 MG/1
120 CAPSULE, COATED, EXTENDED RELEASE ORAL 2 TIMES DAILY
Status: DISCONTINUED | OUTPATIENT
Start: 2020-04-07 | End: 2020-04-07 | Stop reason: HOSPADM

## 2020-04-07 RX ADMIN — AZATHIOPRINE 50 MG: 50 TABLET ORAL at 09:30

## 2020-04-07 RX ADMIN — SODIUM CHLORIDE, POTASSIUM CHLORIDE, SODIUM LACTATE AND CALCIUM CHLORIDE: 600; 310; 30; 20 INJECTION, SOLUTION INTRAVENOUS at 09:34

## 2020-04-07 RX ADMIN — DILTIAZEM HYDROCHLORIDE 120 MG: 120 CAPSULE, COATED, EXTENDED RELEASE ORAL at 09:31

## 2020-04-07 RX ADMIN — SIROLIMUS 1 MG: 1 TABLET, SUGAR COATED ORAL at 09:30

## 2020-04-07 RX ADMIN — PANTOPRAZOLE SODIUM 40 MG: 40 INJECTION, POWDER, FOR SOLUTION INTRAVENOUS at 09:31

## 2020-04-07 RX ADMIN — Medication 10 ML: at 09:32

## 2020-04-07 RX ADMIN — ENOXAPARIN SODIUM 40 MG: 40 INJECTION SUBCUTANEOUS at 09:30

## 2020-04-07 ASSESSMENT — PAIN SCALES - GENERAL: PAINLEVEL_OUTOF10: 0

## 2020-04-07 NOTE — PROGRESS NOTES
5.80 E12/L    Hemoglobin 13.1 12.5 - 16.5 g/dL    Hematocrit 39.2 37.0 - 54.0 %    MCV 86.9 80.0 - 99.9 fL    MCH 29.0 26.0 - 35.0 pg    MCHC 33.4 32.0 - 34.5 %    RDW 14.5 11.5 - 15.0 fL    Platelets 782 222 - 626 E9/L    MPV 10.9 7.0 - 12.0 fL   Magnesium    Collection Time: 04/06/20  6:55 AM   Result Value Ref Range    Magnesium 1.5 (L) 1.6 - 2.6 mg/dL   Phosphorus    Collection Time: 04/06/20  6:55 AM   Result Value Ref Range    Phosphorus 3.5 2.5 - 4.5 mg/dL     Assessment:     Principal Problem:    SBO (small bowel obstruction) ([facility])  Active Problems:    History of renal transplant    Essential hypertension  Resolved Problems:    * No resolved hospital problems. *      Plan: Things seemed to have turned had small BMs last night. Should be able to resume all po BP meds this am and I am sure BP will respond accordingly. Actually if moving bowels and NG out would discharge this am just resuming all home meds.   [facility]  4/7/2020  6:53 AM

## 2020-04-07 NOTE — PROGRESS NOTES
Notified Dr. Javy Coyne of pt's BP. Continue to monitor for now and will re-evaluate in the morning.

## 2020-04-07 NOTE — PLAN OF CARE
Problem: Pain:  Goal: Pain level will decrease  Description: Pain level will decrease  Outcome: Met This Shift     Problem: Falls - Risk of:  Goal: Will remain free from falls  Description: Will remain free from falls  4/7/2020 0606 by Laurie Pollock RN  Outcome: Met This Shift     Problem: Nausea/Vomiting:  Goal: Absence of nausea/vomiting  Description: Absence of nausea/vomiting  4/7/2020 0606 by Laurie Pollock RN  Outcome: Met This Shift

## 2020-04-07 NOTE — PROGRESS NOTES
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 No rashes or lesions. Neck:  neck- supple, no mass, non-tender and no bruits  Lungs:  Normal expansion. Clear to auscultation. No rales, rhonchi, or wheezing. Heart:  Heart regular rate and rhythm     Abdominal: Multiple well-healed incisions midline, bowel sounds diminished  Extremities: Extremities warm to touch, pink, with no edema.  and pulses present in all extremities    DATA:    CBC with Differential:    Lab Results   Component Value Date    WBC 8.6 04/06/2020    RBC 4.51 04/06/2020    HGB 13.1 04/06/2020    HCT 39.2 04/06/2020     04/06/2020    MCV 86.9 04/06/2020    MCH 29.0 04/06/2020    MCHC 33.4 04/06/2020    RDW 14.5 04/06/2020    NRBC 1 10/05/2013    SEGSPCT 59 10/05/2013    LYMPHOPCT 8.6 04/05/2020    MONOPCT 10.2 04/05/2020    BASOPCT 0.1 04/05/2020    MONOSABS 0.95 04/05/2020    LYMPHSABS 0.80 04/05/2020    EOSABS 0.00 04/05/2020    BASOSABS 0.01 04/05/2020     BMP:    Lab Results   Component Value Date     04/06/2020    K 3.5 04/06/2020    CL 99 04/06/2020    CO2 24 04/06/2020    BUN 27 04/06/2020    LABALBU 5.1 04/05/2020    LABALBU 4.6 12/03/2011    CREATININE 1.1 04/06/2020    CALCIUM 9.7 04/06/2020    GFRAA >60 04/06/2020    LABGLOM >60 04/06/2020    GLUCOSE 104 04/06/2020    GLUCOSE 103 12/03/2011          cloNIDine  0.3 mg Oral Nightly    dilTIAZem  120 mg Oral BID    sodium chloride flush  10 mL Intravenous 2 times per day    enoxaparin  40 mg Subcutaneous Daily    pantoprazole  40 mg Intravenous Daily    And    sodium chloride (PF)  10 mL Intravenous Daily    sirolimus  1 mg Oral Daily    azaTHIOprine  50 mg Oral Daily      lactated ringers 75 mL/hr at 04/07/20 0934     diatrizoate meglumine-sodium, sodium chloride flush, HYDROmorphone, ondansetron    IMPRESSION/RECOMMENDATIONS:      Status post renal transplant with baseline creatinine 1.1  History of proteinuria  Obstipation small bowel follow-through showed some dilated loops but   Hypertension  resume  his medications, but stop HCTZ  Hypercalcemia on presentation may have been because of obstipation may change to prn  hydrochlorothiazide as part of his hypertensive regimen  Will arrange follow up in 4 wks with Dr Mandi Rios MD  4/7/2020 2:43 PM

## 2020-04-07 NOTE — PROGRESS NOTES
Okay per thomas to advance to soft diet and to d/c NG tube, if diet is tolerated pt is okay to discharge

## 2020-04-10 LAB — RAPAMUNE: 9.3 NG/ML

## 2020-05-07 ENCOUNTER — TELEPHONE (OUTPATIENT)
Dept: VASCULAR SURGERY | Age: 50
End: 2020-05-07

## 2020-05-07 NOTE — TELEPHONE ENCOUNTER
We see that you have an appointment scheduled, given the current COVID-19 pandemic, Dr. Darci Raman would like to change that visit to a video visit. In order to schedule this appointment you will need a camera-enabled device (smart phone, tablet or computer) and reliable WiFi. Do you wish to schedule this appointment?y    We want to confirm that, for purposes of billing, this is a virtual visit with your provider for which we will submit a claim for reimbursement with your insurance company. You will be responsible for any copays, coinsurance amounts or other amounts not covered by your insurance company. If you do not accept this, unfortunately we will not be able to schedule a virtual visit with the provider.  Do you accept? y  Dr. Darci Raman 5-13-20 3:15 pm

## 2020-05-13 ENCOUNTER — VIRTUAL VISIT (OUTPATIENT)
Dept: VASCULAR SURGERY | Age: 50
End: 2020-05-13
Payer: COMMERCIAL

## 2020-05-13 VITALS — SYSTOLIC BLOOD PRESSURE: 130 MMHG | DIASTOLIC BLOOD PRESSURE: 80 MMHG

## 2020-05-13 PROBLEM — K56.609 SBO (SMALL BOWEL OBSTRUCTION) (HCC): Status: RESOLVED | Noted: 2020-04-05 | Resolved: 2020-05-13

## 2020-05-13 PROCEDURE — 99442 PR PHYS/QHP TELEPHONE EVALUATION 11-20 MIN: CPT | Performed by: SURGERY

## 2020-05-13 NOTE — PROGRESS NOTES
TELEPHONE VISIT    Consent:  He and/or health care decision maker is aware that that he may receive a bill for this telephone service, depending on his insurance coverage, and has provided verbal consent to proceed: Yes      Documentation:  I communicated with the patient and/or health care decision maker about vascular status of the left upper extremity, patient underwent repair of the left brachial artery with a saphenous vein graft, 3 years ago         THE patient does have a kidney transplant, tells me his kidney functions are stable    Unfortunately, patient continued to smoke about 1 pack every to 3 days    His hypertension is stable    Patient denies any symptoms  Left, left hand and fingers    . Details of this discussion including any medical advice provided:     I have discussed the patient, reviewed the last upper extremity artery Doppler study stable vascular status, reviewed the last renal functions including BMP, stable renal functions    The patient was counseled to stop smoking completely and to call me immediately if any symptoms in the left abdomen including pain tingling numbness etc.    The patient recommended approximately artery Doppler study and to call me a few days after the test is done to discuss the test results and follow-up to see me in 2 years    All his questions were answered    Patient also was instructed, because of his decreased immunity, received transplant drugs for prevention of kidney resection, to take extra precautions due to COVID situation and the viral situation, clear-cut instructions were given      I affirm this is a Patient Initiated Episode with a Patient who has not had a related appointment within my department in the past 7 days or scheduled within the next 24 hours.         Patient's location: {[id number]::\"home address in [place]\",\"other address in [facility]   Physician  location home address in [facility]   Other people involved in call          Total Time: minutes:

## 2020-05-14 ENCOUNTER — TELEPHONE (OUTPATIENT)
Dept: VASCULAR SURGERY | Age: 50
End: 2020-05-14

## 2020-05-31 NOTE — DISCHARGE SUMMARY
Patient ID:  Eran Smalls  [id number]  29 y.o.  1970    Admit date: 4/5/2020    Discharge date and time: 4/7/2020  5:39 PM     Admission Diagnoses: SBO (small bowel obstruction) ([facility] [address] [K56.609]  SBO (small bowel obstruction) ([facility] [address] [K56.609]    Discharge Diagnoses:   Patient Active Problem List   Diagnosis    Tobacco dependence    Decreased radial pulse    History of renal transplant    Essential hypertension       Consults: general surgery and renal    Procedures:   none    Hospital Course:  Pt admitted with SBO - pt hx of renal transplant - he turned around with conservative management    Discharge Exam:  See progress note from today    Disposition: home    Condition at Discharge:  fair    Patient Instructions:   Discharge Medication List as of 4/7/2020  3:47 PM      CONTINUE these medications which have NOT CHANGED    Details   sirolimus (RAPAMUNE) 1 MG tablet Take 2 mg by mouth daily      AzaTHIOprine (IMURAN PO) Take 75 mg by mouth daily      diltiazem (CARDIZEM CD) 240 MG extended release capsule Take 240 mg by mouth 2 times daily Take morning of surgery with a sip of water      cloNIDine (CATAPRES) 0.3 MG tablet Take 0.3 mg by mouth nightly       potassium chloride (KLOR-CON) 20 MEQ packet Take 40 mEq by mouth daily       sulfamethoxazole-trimethoprim (BACTRIM DS;SEPTRA DS) 800-160 MG per tablet Take 1 tablet by mouth three times a week tue thur sat         STOP taking these medications       PHOS--160-250 MG PACK Comments:   Reason for Stopping:         hydrochlorothiazide (HYDRODIURIL) 25 MG tablet Comments:   Reason for Stopping:         aspirin 81 MG chewable tablet Comments:   Reason for Stopping:         vitamin D (CHOLECALCIFEROL) 1000 UNIT TABS tablet Comments:   Reason for Stopping:             Activity: activity as tolerated  Diet: renal diet    Follow-up with me in 1 week.     Note that over 30 minutes was spent in preparing discharge papers, discussing discharge with patient, medication review, etc.    Signed:  Phylicia Jackson  5/31/2020  6:47 AM

## 2020-06-15 ENCOUNTER — HOSPITAL ENCOUNTER (OUTPATIENT)
Dept: INTERVENTIONAL RADIOLOGY/VASCULAR | Age: 50
Discharge: HOME | End: 2020-06-17
Payer: COMMERCIAL

## 2020-06-15 PROCEDURE — 93923 UPR/LXTR ART STDY 3+ LVLS: CPT

## 2020-06-17 ENCOUNTER — TELEPHONE (OUTPATIENT)
Dept: VASCULAR SURGERY | Age: 50
End: 2020-06-17

## 2020-06-17 NOTE — TELEPHONE ENCOUNTER
----- Message from Cristy Ledezma MD sent at 6/16/2020  2:58 PM EDT -----  Please notify patient, that the arterial Doppler study of the upper extremity, satisfactory, call PRN, keep the next appointment

## 2021-03-06 ENCOUNTER — IMMUNIZATION (OUTPATIENT)
Dept: PRIMARY CARE CLINIC | Age: 51
End: 2021-03-06
Payer: COMMERCIAL

## 2021-03-06 PROCEDURE — 91300 COVID-19, PFIZER VACCINE 30MCG/0.3ML DOSE: CPT | Performed by: PHYSICIAN ASSISTANT

## 2021-03-06 PROCEDURE — 0001A COVID-19, PFIZER VACCINE 30MCG/0.3ML DOSE: CPT | Performed by: PHYSICIAN ASSISTANT

## 2021-04-01 ENCOUNTER — IMMUNIZATION (OUTPATIENT)
Dept: PRIMARY CARE CLINIC | Age: 51
End: 2021-04-01
Payer: COMMERCIAL

## 2021-04-01 PROCEDURE — 91300 COVID-19, PFIZER VACCINE 30MCG/0.3ML DOSE: CPT | Performed by: NURSE PRACTITIONER

## 2021-04-01 PROCEDURE — 0002A COVID-19, PFIZER VACCINE 30MCG/0.3ML DOSE: CPT | Performed by: NURSE PRACTITIONER

## 2022-05-04 DIAGNOSIS — R09.89 DECREASED RADIAL PULSE: Primary | ICD-10-CM

## 2022-05-12 ENCOUNTER — OFFICE VISIT (OUTPATIENT)
Dept: VASCULAR SURGERY | Age: 52
End: 2022-05-12
Payer: COMMERCIAL

## 2022-05-12 ENCOUNTER — HOSPITAL ENCOUNTER (OUTPATIENT)
Dept: CARDIOLOGY | Age: 52
Discharge: HOME | End: 2022-05-12
Payer: COMMERCIAL

## 2022-05-12 VITALS — HEIGHT: 70 IN | WEIGHT: 115 LBS | BODY MASS INDEX: 16.46 KG/M2

## 2022-05-12 DIAGNOSIS — Z94.0 HISTORY OF RENAL TRANSPLANT: Chronic | ICD-10-CM

## 2022-05-12 DIAGNOSIS — R09.89 DECREASED RADIAL PULSE: Primary | ICD-10-CM

## 2022-05-12 DIAGNOSIS — R09.89 DECREASED RADIAL PULSE: ICD-10-CM

## 2022-05-12 PROCEDURE — G8419 CALC BMI OUT NRM PARAM NOF/U: HCPCS | Performed by: SURGERY

## 2022-05-12 PROCEDURE — 93923 UPR/LXTR ART STDY 3+ LVLS: CPT

## 2022-05-12 PROCEDURE — 4004F PT TOBACCO SCREEN RCVD TLK: CPT | Performed by: SURGERY

## 2022-05-12 PROCEDURE — 99214 OFFICE O/P EST MOD 30 MIN: CPT | Performed by: SURGERY

## 2022-05-12 PROCEDURE — 3017F COLORECTAL CA SCREEN DOC REV: CPT | Performed by: SURGERY

## 2022-05-12 PROCEDURE — G8427 DOCREV CUR MEDS BY ELIG CLIN: HCPCS | Performed by: SURGERY

## 2022-05-12 RX ORDER — MULTIVIT-MIN/IRON/FOLIC ACID/K 18-600-40
CAPSULE ORAL
COMMUNITY

## 2022-05-12 RX ORDER — ASPIRIN 81 MG/1
81 TABLET ORAL DAILY
COMMUNITY

## 2022-05-12 NOTE — PROGRESS NOTES
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 and interposition saphenous vein graft harvest of the left groin, overall doing well    Patient has undergone kidney transplant many years ago, tells me his renal functions are stable    Patient has no symptoms involving the left hand      Patient denies any focal lateralizing neurological symptoms like loss of speech, vision or loss of function of extremity    Patient can walk a few blocks , and denies any symptoms of rest pain    Allergies   Allergen Reactions    Ace Inhibitors     Diovan [Valsartan]     Horse-Derived Products        Current Outpatient Medications   Medication Sig Dispense Refill    aspirin 81 MG EC tablet Take 81 mg by mouth daily      Cholecalciferol (VITAMIN D) 50 MCG (2000 UT) CAPS capsule Take by mouth      sirolimus (RAPAMUNE) 1 MG tablet Take 2 mg by mouth daily      AzaTHIOprine (IMURAN PO) Take 75 mg by mouth daily      diltiazem (CARDIZEM CD) 240 MG extended release capsule Take 240 mg by mouth 2 times daily Take morning of surgery with a sip of water      cloNIDine (CATAPRES) 0.3 MG tablet Take 0.3 mg by mouth nightly       potassium chloride (KLOR-CON) 20 MEQ packet Take 40 mEq by mouth daily       sulfamethoxazole-trimethoprim (BACTRIM DS;SEPTRA DS) 800-160 MG per tablet Take 1 tablet by mouth three times a week anne pereira sat       No current facility-administered medications for this visit.        Past Medical History:   Diagnosis Date    Brachial artery occlusion, left ([facility] [address] 1/4/2017    Chronic kidney disease     congenital kidney disease    Decreased radial pulse 11/30/2016    left    History of blood transfusion     26years ago    Hypertension     Ischemia of finger 11/30/2016    left hand, index    Post-operative state 2/1/2017    Seizures ([address]     25 years ago prior to kidney transplant    Tobacco dependence 11/30/2016       Past Surgical History:   Procedure Laterality Date    APPENDECTOMY      ARTERIAL ANEURYSM REPAIR Left 01/11/2017    Dr. Jatin Dennison  2013   [place]    Dr. Michael Bower    OTHER SURGICAL HISTORY  12/20/2016    Left Subclavian Angiography    TOTAL NEPHRECTOMY Bilateral 1991       History reviewed. No pertinent family history. Social History     Socioeconomic History    Marital status: Single     Spouse name: Not on file    Number of children: Not on file    Years of education: Not on file    Highest education level: Not on file   Occupational History    Not on file   Tobacco Use    Smoking status: Light Tobacco Smoker     Packs/day: 0.25     Types: Cigarettes    Smokeless tobacco: Never Used   Vaping Use    Vaping Use: Never used   Substance and Sexual Activity    Alcohol use: Yes     Comment: rare    Drug use: No    Sexual activity: Not on file   Other Topics Concern    Not on file   Social History Narrative    Not on file     Social Determinants of Health     Financial Resource Strain:     Difficulty of Paying Living Expenses: Not on file   Food Insecurity:     Worried About Running Out of Food in the Last Year: Not on file    Greyson of Food in the Last Year: Not on file   Transportation Needs:     Lack of Transportation (Medical): Not on file    Lack of Transportation (Non-Medical):  Not on file   Physical Activity:     Days of Exercise per Week: Not on file    Minutes of Exercise per Session: Not on file   Stress:     Feeling of Stress : Not on file   Social Connections:     Frequency of Communication with Friends and Family: Not on file    Frequency of Social Gatherings with Friends and Family: Not on file    Attends Yarsani Services: Not on file   [employer] of Clubs or Organizations: Not on file    Attends Club or Organization Meetings: Not on file    Marital Status: Not on file   Intimate Partner Violence:     Fear of Current or Ex-Partner: Not on file    Emotionally Abused: Not on file    Physically Abused: Not on file    Sexually Abused: Not on file   Housing Stability:     Unable to Pay for Housing in the Last Year: Not on file    Number of [place] in the Last Year: Not on file    Unstable Housing in the Last Year: Not on file       Review of Systems:    Eyes:  No blurring, diplopia or vision loss. Respiratory:  No cough, pleuritic chest pain, dyspnea, or wheezing. Cardiovascular: No angina, palpitations . Hypertension  Musculoskeletal:  No arthritis or weakness. Neurologic:  No paralysis, paresis, paresthesia, seizures or headache. Endocrinology:     Nephrology: History of kidney transplant many years ago, with stable renal functions      Physical Exam:  General appearance:  Alert, awake, oriented x 3. No distress. Eyes:  Conjunctivae appear normal; PERRL  Neck:  No jugular venous distention, lymphadenopathy or thyromegaly. No evidence of carotid bruit  Lungs:  Clear to ausculation bilaterally. No rhonchi, crackles, wheezes  Heart:  Regular rate and rhythm. No rub or murmur  Abdomen:  Soft, non-tender. No masses, organomegaly. Musculoskeletal : No joint effusions, tenderness swelling    Neuro: Speech is intact. Moving all extremities. No focal motor or sensory deficits      Extremities:  Both feet are warm to touch. The color of both feet is normal.    Patient has scars of the left forearm    Pulses Right  Left    Brachial 3 3    Radial 3 2-3  3=normal   Femoral 2 2  2=diminished   Popliteal    1=barely palpable   Dorsalis pedis    0=absent   Posterior tibial 2 2  4=aneurysmal             Other pertinent information:1. The past medical records were reviewed. Assessment:    1. Decreased radial pulse    2.  History of renal transplant Plan:       Discussed the patient regarding results of the upper extremity arterial Doppler study, patent brachial vein bypass graft, good arterial flow to the forearm based upon the pulse well recording, good Doppler signals at the level of the wrist, triphasic, patient was reassured, call as needed if any symptoms in the left arm, explained              Patient was instructed to continue walking program and to call if any worsening of symptoms and to call if any focal lateralizing neurological symptoms like loss of speech, vision or loss of function of extremity. All the questions were answered. Indicated follow-up: Return in about 2 years (around 5/12/2024), or if symptoms worsen or fail to improve.

## 2022-05-16 NOTE — PROCEDURES
[address]                  [address] [facility],  [facility]                                VASCULAR REPORT    PATIENT NAME: José Luis Rm                 :        1970  MED REC NO:   [id number]                            ROOM:  ACCOUNT NO:   [de-identified]                           ADMIT DATE: 2022  PROVIDER:     Cori Cruz MD    DATE OF PROCEDURE:  2022    UPPER EXTREMITY ARTERIAL DOPPLER STUDY    INDICATION:  History of bypass of the left brachial artery with  diminished left radial pulse. FINDINGS:  Upper extremity arterial Doppler study revealed the patient  has satisfactory arterial circulation with equal systolic blood pressure  bilaterally with triphasic Doppler tracings at the wrist and good  arterial flow to both forearms based upon the pulse volume recordings at  the wrist level. IMPRESSION:  Normal upper extremity arterial Doppler study, status post  left brachial artery repair, unchanged from two years ago.         Naima Cotton MD    D: 05/15/2022 11:40:27       T: 05/15/2022 11:42:50     [email or web address]  Job#: [id number]     Doc#: [id number]

## 2024-04-19 DIAGNOSIS — R09.89 DECREASED RADIAL PULSE: Primary | ICD-10-CM

## 2024-05-09 ENCOUNTER — OFFICE VISIT (OUTPATIENT)
Dept: VASCULAR SURGERY | Age: 54
End: 2024-05-09
Payer: COMMERCIAL

## 2024-05-09 ENCOUNTER — HOSPITAL ENCOUNTER (OUTPATIENT)
Dept: CARDIOLOGY | Age: 54
Discharge: HOME | End: 2024-05-11
Payer: COMMERCIAL

## 2024-05-09 VITALS — BODY MASS INDEX: 16.07 KG/M2 | WEIGHT: 112 LBS

## 2024-05-09 DIAGNOSIS — R09.89 DECREASED RADIAL PULSE: Primary | ICD-10-CM

## 2024-05-09 DIAGNOSIS — R09.89 DECREASED RADIAL PULSE: ICD-10-CM

## 2024-05-09 DIAGNOSIS — Z94.0 HISTORY OF RENAL TRANSPLANT: Chronic | ICD-10-CM

## 2024-05-09 LAB
VAS LEFT 2ND DIGIT BP: 120 MMHG
VAS LEFT ARM BP: 114 MMHG
VAS LEFT DBI 2ND DIGIT: 1.05
VAS LEFT RADIAL A PROX BP: 119 MMHG
VAS LEFT ULNAR A PROX BP: 118 MMHG
VAS LEFT WBI: 1.04
VAS RIGHT 2ND DIGIT BP: 130 MMHG
VAS RIGHT ARM BP: 113 MMHG
VAS RIGHT DBI 2ND DIGIT: 1.14
VAS RIGHT RADIAL A PROX BP: 133 MMHG
VAS RIGHT ULNAR A PROX BP: 132 MMHG
VAS RIGHT WBI: 1.17

## 2024-05-09 PROCEDURE — 3017F COLORECTAL CA SCREEN DOC REV: CPT | Performed by: SURGERY

## 2024-05-09 PROCEDURE — 99214 OFFICE O/P EST MOD 30 MIN: CPT | Performed by: SURGERY

## 2024-05-09 PROCEDURE — G8419 CALC BMI OUT NRM PARAM NOF/U: HCPCS | Performed by: SURGERY

## 2024-05-09 PROCEDURE — G8427 DOCREV CUR MEDS BY ELIG CLIN: HCPCS | Performed by: SURGERY

## 2024-05-09 PROCEDURE — 4004F PT TOBACCO SCREEN RCVD TLK: CPT | Performed by: SURGERY

## 2024-05-09 PROCEDURE — 93923 UPR/LXTR ART STDY 3+ LVLS: CPT
